# Patient Record
Sex: MALE
[De-identification: names, ages, dates, MRNs, and addresses within clinical notes are randomized per-mention and may not be internally consistent; named-entity substitution may affect disease eponyms.]

---

## 2018-03-12 PROBLEM — Z00.00 ENCOUNTER FOR PREVENTIVE HEALTH EXAMINATION: Status: ACTIVE | Noted: 2018-03-12

## 2018-04-02 ENCOUNTER — RECORD ABSTRACTING (OUTPATIENT)
Age: 53
End: 2018-04-02

## 2018-04-02 DIAGNOSIS — Z78.9 OTHER SPECIFIED HEALTH STATUS: ICD-10-CM

## 2018-04-02 RX ORDER — PRAVASTATIN SODIUM 20 MG/1
20 TABLET ORAL
Refills: 0 | Status: ACTIVE | COMMUNITY

## 2018-04-02 RX ORDER — AMLODIPINE BESYLATE AND BENAZEPRIL HYDROCHLORIDE 10; 20 MG/1; MG/1
10-20 CAPSULE ORAL
Refills: 0 | Status: ACTIVE | COMMUNITY

## 2018-04-02 RX ORDER — ASPIRIN 81 MG
81 TABLET, DELAYED RELEASE (ENTERIC COATED) ORAL DAILY
Refills: 0 | Status: ACTIVE | COMMUNITY

## 2018-04-02 RX ORDER — CALCIUM CARBONATE/VITAMIN D3 600 MG-10
TABLET ORAL
Refills: 0 | Status: ACTIVE | COMMUNITY

## 2018-04-09 ENCOUNTER — APPOINTMENT (OUTPATIENT)
Dept: CARDIOLOGY | Facility: CLINIC | Age: 53
End: 2018-04-09

## 2020-09-24 ENCOUNTER — NON-APPOINTMENT (OUTPATIENT)
Age: 55
End: 2020-09-24

## 2020-09-24 ENCOUNTER — APPOINTMENT (OUTPATIENT)
Dept: CARDIOLOGY | Facility: CLINIC | Age: 55
End: 2020-09-24
Payer: COMMERCIAL

## 2020-09-24 VITALS
DIASTOLIC BLOOD PRESSURE: 85 MMHG | WEIGHT: 224 LBS | OXYGEN SATURATION: 98 % | HEART RATE: 69 BPM | HEIGHT: 72 IN | TEMPERATURE: 97.8 F | RESPIRATION RATE: 16 BRPM | BODY MASS INDEX: 30.34 KG/M2 | SYSTOLIC BLOOD PRESSURE: 140 MMHG

## 2020-09-24 DIAGNOSIS — Z82.0 FAMILY HISTORY OF EPILEPSY AND OTHER DISEASES OF THE NERVOUS SYSTEM: ICD-10-CM

## 2020-09-24 DIAGNOSIS — Z78.9 OTHER SPECIFIED HEALTH STATUS: ICD-10-CM

## 2020-09-24 DIAGNOSIS — Z82.3 FAMILY HISTORY OF STROKE: ICD-10-CM

## 2020-09-24 DIAGNOSIS — D36.9 BENIGN NEOPLASM, UNSPECIFIED SITE: ICD-10-CM

## 2020-09-24 DIAGNOSIS — R51 HEADACHE: ICD-10-CM

## 2020-09-24 DIAGNOSIS — Z86.79 PERSONAL HISTORY OF OTHER DISEASES OF THE CIRCULATORY SYSTEM: ICD-10-CM

## 2020-09-24 DIAGNOSIS — Z82.49 FAMILY HISTORY OF ISCHEMIC HEART DISEASE AND OTHER DISEASES OF THE CIRCULATORY SYSTEM: ICD-10-CM

## 2020-09-24 PROCEDURE — 99204 OFFICE O/P NEW MOD 45 MIN: CPT

## 2020-09-24 PROCEDURE — 93000 ELECTROCARDIOGRAM COMPLETE: CPT

## 2020-09-30 NOTE — REASON FOR VISIT
[FreeTextEntry1] : 55-year-old patient presents here for cardiac evaluation, last having been seen more than three years ago.  He is concerned about a throbbing sensation in the left temple area. This began when he had a hangover about a week ago.  It dissipated after about a day.  He denies any shortness of breath, chest pain, or palpitations.  He does not check his blood pressure but knows that it is elevated. \par \par His other history is that of:\par 1.  Hyperlipidemia. \par 2.  Obesity.\par 3.  Dilated aortic root. 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 \par \par He admits that he is somewhat indiscriminate with diet and does not exercise regularly.  \par

## 2020-09-30 NOTE — ASSESSMENT
[FreeTextEntry1] :  The EKG shows normal rhythm at 69 with some non-specific T-wave changes. \par \par   Laboratory 6/8/20: \par Cholesterol 156\par  HDL 51\par  LDL 79 \par A1C of 5.5 \par Electrolytes and LFTs were within normal limits.     \par \par   Impression:  \par Patient presents with a transient pounding or throbbing sensation of the left temple more than a week ago that occurred in the face of a hangover and has not recurred.   He has hypertension which may or may not be adequately controlled, does not really know what he typically runs.   \par \par  He has obesity with a BMI of over 30.    There is a history of a dilated aortic root.   He is somewhat inattentive to nutrition and exercise.  \par \par   I discussed all of the above with the patient at some length and made recommendations including: \par 1.  Monitor his blood pressure over the next couple of weeks. \par 2.   Much stricter attention to nutrition in the form of carbohydrate, fat, and sodium restriction.  \par 3.  A regular exercise program to consist of 20-30 minutes of walking five days a week.   \par \par The patient will undergo an echocardiogram and an exercise stress test in the near future.  Because of his history of a dilated thoracic aorta, the abdominal aorta should also be checked in the near future.  \par \par Discussed all of this with the patient who understands and agrees and will follow up with regard to the above.   \par \par I appreciate the opportunity to contribute to his care.

## 2020-11-13 ENCOUNTER — APPOINTMENT (OUTPATIENT)
Dept: CARDIOLOGY | Facility: CLINIC | Age: 55
End: 2020-11-13
Payer: COMMERCIAL

## 2020-11-13 PROCEDURE — 93978 VASCULAR STUDY: CPT

## 2020-12-23 ENCOUNTER — APPOINTMENT (OUTPATIENT)
Dept: CARDIOLOGY | Facility: CLINIC | Age: 55
End: 2020-12-23
Payer: COMMERCIAL

## 2020-12-23 PROCEDURE — 99072 ADDL SUPL MATRL&STAF TM PHE: CPT

## 2020-12-23 PROCEDURE — 93015 CV STRESS TEST SUPVJ I&R: CPT

## 2020-12-23 PROCEDURE — 93306 TTE W/DOPPLER COMPLETE: CPT

## 2021-01-14 ENCOUNTER — APPOINTMENT (OUTPATIENT)
Dept: CARDIOLOGY | Facility: CLINIC | Age: 56
End: 2021-01-14
Payer: COMMERCIAL

## 2021-01-14 ENCOUNTER — NON-APPOINTMENT (OUTPATIENT)
Age: 56
End: 2021-01-14

## 2021-01-14 VITALS
SYSTOLIC BLOOD PRESSURE: 130 MMHG | OXYGEN SATURATION: 98 % | WEIGHT: 227 LBS | TEMPERATURE: 97.3 F | BODY MASS INDEX: 30.75 KG/M2 | HEART RATE: 70 BPM | HEIGHT: 72 IN | DIASTOLIC BLOOD PRESSURE: 98 MMHG | RESPIRATION RATE: 16 BRPM

## 2021-01-14 DIAGNOSIS — I77.810 THORACIC AORTIC ECTASIA: ICD-10-CM

## 2021-01-14 DIAGNOSIS — I10 ESSENTIAL (PRIMARY) HYPERTENSION: ICD-10-CM

## 2021-01-14 DIAGNOSIS — E78.5 HYPERLIPIDEMIA, UNSPECIFIED: ICD-10-CM

## 2021-01-14 PROCEDURE — 93000 ELECTROCARDIOGRAM COMPLETE: CPT

## 2021-01-14 PROCEDURE — 99072 ADDL SUPL MATRL&STAF TM PHE: CPT

## 2021-01-14 PROCEDURE — 99214 OFFICE O/P EST MOD 30 MIN: CPT

## 2021-01-14 NOTE — ASSESSMENT
[FreeTextEntry1] :  The EKG shows normal rhythm at  70  with some non-specific T-wave changes. \par \par   Laboratory 6/8/20: \par Cholesterol 156\par  HDL 51\par  LDL 79 \par A1C of 5.5 \par Electrolytes and LFTs were within normal limits.     \par \par  11/13/2020: Proximal abdominal aorta 3 cm fusiform dilatation of the proximal abdominal aorta noted.\par \par Echocardiogram 12/23/2020\par normal LV size and function.  Mild dilatation of the ascending aorta 3.6 cm.\par \par Exercise stress test 12/23/2020:\par 7 minutes 6 seconds\par Heart rate 130 submaximal 80%\par Blood pressure 180\par No ischemic ECG changes or arrhythmias.\par \par  Impression:  \par Patient presents with a transient pounding or throbbing sensation of the left temple, which has resolved.\par   He has hypertension with home measurements that indicate adequate control though today's reading seems to be elevated.  \par He has obesity with a BMI of over 30. \par \par    There is a history of a dilated aortic root.  Which is just borderline at 3.6 cm.\par \par Notably the abdominal aorta shows some mild proximal abdominal aortic dilatation at 3 cm.\par \par   He is somewhat inattentive to nutrition and exercise.  \par \par  Exercise stress test shows reduction in overall functional capacity likely reflecting lack of any regular exercise.  There is, over, no evidence of ischemia\par \par  I discussed all of the above with the patient at some length and made recommendations including: \par 1.  He should continue to monitor his blood pressure over the next couple of weeks. \par 2.   Much stricter attention to nutrition in the form of carbohydrate, fat, and sodium restriction.  \par 3.  A regular exercise program to consist of 20-30 minutes of walking five days a week.   \par 4.  Repeat abdominal aortic sonogram in 1 year\par 5, clinical follow-up in 6 months\par \par Discussed all of this with the patient who understands and agrees and will follow up with regard to the above.   \par \par I appreciate the opportunity to contribute to his care.

## 2021-01-14 NOTE — REASON FOR VISIT
[FreeTextEntry1] : 55-year-old patient  here for cardiac re-evaluation.\par  He was  concerned about a throbbing sensation in the left temple area that dissipated after about a day.  He denies any shortness of breath, chest pain, or palpitations. \par \par He has been checking his blood pressure at home running about 120/70 fairly consistently.\par \par His other history is that of:\par 1.  Hyperlipidemia. \par 2.  Obesity.\par 3.  Dilated aortic root. 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 \par \par He admits that he is somewhat indiscriminate with diet and does not exercise regularly.  \par \par Patient here to review the results of testing including:\par  an abdominal aortic sonogram\par An echocardiogram\par An exercise stress test

## 2023-10-03 ENCOUNTER — OFFICE (OUTPATIENT)
Dept: URBAN - METROPOLITAN AREA CLINIC 6 | Facility: CLINIC | Age: 58
Setting detail: OPHTHALMOLOGY
End: 2023-10-03
Payer: COMMERCIAL

## 2023-10-03 DIAGNOSIS — H25.13: ICD-10-CM

## 2023-10-03 DIAGNOSIS — D31.31: ICD-10-CM

## 2023-10-03 DIAGNOSIS — H43.393: ICD-10-CM

## 2023-10-03 PROCEDURE — 92250 FUNDUS PHOTOGRAPHY W/I&R: CPT | Performed by: OPHTHALMOLOGY

## 2023-10-03 PROCEDURE — 92014 COMPRE OPH EXAM EST PT 1/>: CPT | Performed by: OPHTHALMOLOGY

## 2023-10-03 ASSESSMENT — REFRACTION_AUTOREFRACTION
OD_SPHERE: 0.00
OS_AXIS: 079
OD_AXIS: 130
OS_CYLINDER: -1.25
OS_SPHERE: +0.75
OD_CYLINDER: -0.25

## 2023-10-03 ASSESSMENT — SPHEQUIV_DERIVED
OD_SPHEQUIV: -0.125
OD_SPHEQUIV: -0.125
OS_SPHEQUIV: 0.125
OS_SPHEQUIV: 0.125

## 2023-10-03 ASSESSMENT — REFRACTION_MANIFEST
OD_CYLINDER: -0.25
OS_SPHERE: +0.75
OU_VA: 20/20-1
OS_AXIS: 080
OD_ADD: +2.25
OS_VA1: 20/20-1
OD_AXIS: 130
OS_CYLINDER: -1.25
OD_VA1: 20/20-1
OS_ADD: +2.25
OD_SPHERE: 0.00

## 2023-10-03 ASSESSMENT — AXIALLENGTH_DERIVED
OD_AL: 24.0154
OD_AL: 24.0154
OS_AL: 23.9149
OS_AL: 23.9149

## 2023-10-03 ASSESSMENT — KERATOMETRY
OS_AXISANGLE_DEGREES: 168
OD_K2POWER_DIOPTERS: 42.75
OD_K1POWER_DIOPTERS: 42.25
OD_AXISANGLE_DEGREES: 090
OS_K1POWER_DIOPTERS: 42.00
OS_K2POWER_DIOPTERS: 43.00
METHOD_AUTO_MANUAL: AUTO

## 2023-10-03 ASSESSMENT — VISUAL ACUITY
OD_BCVA: 20/40-1
OS_BCVA: 20/25-1

## 2023-10-03 ASSESSMENT — TONOMETRY
OS_IOP_MMHG: 15
OD_IOP_MMHG: 17

## 2023-10-03 ASSESSMENT — CONFRONTATIONAL VISUAL FIELD TEST (CVF)
OD_FINDINGS: FULL
OS_FINDINGS: FULL

## 2024-10-29 ENCOUNTER — OFFICE (OUTPATIENT)
Dept: URBAN - METROPOLITAN AREA CLINIC 6 | Facility: CLINIC | Age: 59
Setting detail: OPHTHALMOLOGY
End: 2024-10-29
Payer: COMMERCIAL

## 2024-10-29 ENCOUNTER — APPOINTMENT (OUTPATIENT)
Dept: CARDIOLOGY | Facility: CLINIC | Age: 59
End: 2024-10-29
Payer: COMMERCIAL

## 2024-10-29 VITALS
RESPIRATION RATE: 16 BRPM | BODY MASS INDEX: 30.2 KG/M2 | DIASTOLIC BLOOD PRESSURE: 70 MMHG | HEIGHT: 72 IN | HEART RATE: 77 BPM | WEIGHT: 223 LBS | SYSTOLIC BLOOD PRESSURE: 132 MMHG | OXYGEN SATURATION: 96 %

## 2024-10-29 DIAGNOSIS — I77.810 THORACIC AORTIC ECTASIA: ICD-10-CM

## 2024-10-29 DIAGNOSIS — I10 ESSENTIAL (PRIMARY) HYPERTENSION: ICD-10-CM

## 2024-10-29 DIAGNOSIS — D31.31: ICD-10-CM

## 2024-10-29 DIAGNOSIS — H25.13: ICD-10-CM

## 2024-10-29 DIAGNOSIS — E78.5 HYPERLIPIDEMIA, UNSPECIFIED: ICD-10-CM

## 2024-10-29 DIAGNOSIS — H43.393: ICD-10-CM

## 2024-10-29 PROCEDURE — 99204 OFFICE O/P NEW MOD 45 MIN: CPT

## 2024-10-29 PROCEDURE — 93000 ELECTROCARDIOGRAM COMPLETE: CPT

## 2024-10-29 PROCEDURE — G2211 COMPLEX E/M VISIT ADD ON: CPT | Mod: NC

## 2024-10-29 PROCEDURE — 92014 COMPRE OPH EXAM EST PT 1/>: CPT | Performed by: OPHTHALMOLOGY

## 2024-10-29 PROCEDURE — 92250 FUNDUS PHOTOGRAPHY W/I&R: CPT | Performed by: OPHTHALMOLOGY

## 2024-10-29 ASSESSMENT — REFRACTION_AUTOREFRACTION
OD_AXIS: 127
OD_SPHERE: +0.25
OD_CYLINDER: -0.25
OS_AXIS: 074
OS_CYLINDER: -1.25
OS_SPHERE: +0.75

## 2024-10-29 ASSESSMENT — REFRACTION_MANIFEST
OD_AXIS: 130
OS_VA1: 20/30+2
OD_CYLINDER: -0.25
OS_SPHERE: +0.75
OS_AXIS: 074
OS_ADD: +2.25
OD_ADD: +2.25
OD_SPHERE: +0.25
OS_CYLINDER: -1.25
OD_VA1: 20/20-2
OU_VA: 20/20-2

## 2024-10-29 ASSESSMENT — KERATOMETRY
OD_AXISANGLE_DEGREES: 085
OD_K2POWER_DIOPTERS: 42.75
OS_K1POWER_DIOPTERS: 42.00
OS_AXISANGLE_DEGREES: 164
METHOD_AUTO_MANUAL: AUTO
OS_K2POWER_DIOPTERS: 42.75
OD_K1POWER_DIOPTERS: 42.25

## 2024-10-29 ASSESSMENT — TONOMETRY
OD_IOP_MMHG: 14
OS_IOP_MMHG: 13

## 2024-10-29 ASSESSMENT — CONFRONTATIONAL VISUAL FIELD TEST (CVF)
OD_FINDINGS: FULL
OS_FINDINGS: FULL

## 2024-10-29 ASSESSMENT — VISUAL ACUITY
OS_BCVA: 20/20
OD_BCVA: 20/60-1

## 2024-11-20 ENCOUNTER — APPOINTMENT (OUTPATIENT)
Dept: CT IMAGING | Facility: CLINIC | Age: 59
End: 2024-11-20
Payer: COMMERCIAL

## 2024-11-20 ENCOUNTER — OUTPATIENT (OUTPATIENT)
Dept: OUTPATIENT SERVICES | Facility: HOSPITAL | Age: 59
LOS: 1 days | End: 2024-11-20
Payer: COMMERCIAL

## 2024-11-20 DIAGNOSIS — I77.810 THORACIC AORTIC ECTASIA: ICD-10-CM

## 2024-11-20 DIAGNOSIS — I10 ESSENTIAL (PRIMARY) HYPERTENSION: ICD-10-CM

## 2024-11-20 DIAGNOSIS — E78.5 HYPERLIPIDEMIA, UNSPECIFIED: ICD-10-CM

## 2024-11-20 PROCEDURE — 75571 CT HRT W/O DYE W/CA TEST: CPT

## 2024-11-20 PROCEDURE — 75571 CT HRT W/O DYE W/CA TEST: CPT | Mod: 26

## 2024-11-25 ENCOUNTER — NON-APPOINTMENT (OUTPATIENT)
Age: 59
End: 2024-11-25

## 2024-11-25 DIAGNOSIS — R93.1 ABNORMAL FINDINGS ON DIAGNOSTIC IMAGING OF HEART AND CORONARY CIRCULATION: ICD-10-CM

## 2024-11-25 DIAGNOSIS — I10 ESSENTIAL (PRIMARY) HYPERTENSION: ICD-10-CM

## 2024-11-25 DIAGNOSIS — E78.5 HYPERLIPIDEMIA, UNSPECIFIED: ICD-10-CM

## 2025-01-08 ENCOUNTER — APPOINTMENT (OUTPATIENT)
Dept: CARDIOLOGY | Facility: CLINIC | Age: 60
End: 2025-01-08
Payer: COMMERCIAL

## 2025-01-08 PROCEDURE — 93015 CV STRESS TEST SUPVJ I&R: CPT

## 2025-01-15 ENCOUNTER — NON-APPOINTMENT (OUTPATIENT)
Age: 60
End: 2025-01-15

## 2025-01-16 ENCOUNTER — NON-APPOINTMENT (OUTPATIENT)
Age: 60
End: 2025-01-16

## 2025-04-01 ENCOUNTER — APPOINTMENT (OUTPATIENT)
Dept: CARDIOLOGY | Facility: CLINIC | Age: 60
End: 2025-04-01
Payer: COMMERCIAL

## 2025-04-01 VITALS
DIASTOLIC BLOOD PRESSURE: 70 MMHG | WEIGHT: 233 LBS | RESPIRATION RATE: 16 BRPM | BODY MASS INDEX: 31.56 KG/M2 | HEART RATE: 81 BPM | OXYGEN SATURATION: 95 % | HEIGHT: 72 IN | SYSTOLIC BLOOD PRESSURE: 120 MMHG

## 2025-04-01 DIAGNOSIS — I10 ESSENTIAL (PRIMARY) HYPERTENSION: ICD-10-CM

## 2025-04-01 DIAGNOSIS — R93.1 ABNORMAL FINDINGS ON DIAGNOSTIC IMAGING OF HEART AND CORONARY CIRCULATION: ICD-10-CM

## 2025-04-01 DIAGNOSIS — E78.5 HYPERLIPIDEMIA, UNSPECIFIED: ICD-10-CM

## 2025-04-01 DIAGNOSIS — I77.810 THORACIC AORTIC ECTASIA: ICD-10-CM

## 2025-04-01 PROCEDURE — G2211 COMPLEX E/M VISIT ADD ON: CPT | Mod: NC

## 2025-04-01 PROCEDURE — 99214 OFFICE O/P EST MOD 30 MIN: CPT

## 2025-04-01 PROCEDURE — 93000 ELECTROCARDIOGRAM COMPLETE: CPT

## 2025-04-01 RX ORDER — ROSUVASTATIN CALCIUM 10 MG/1
10 TABLET, FILM COATED ORAL
Qty: 90 | Refills: 3 | Status: ACTIVE | COMMUNITY
Start: 2025-04-01 | End: 1900-01-01

## 2025-09-08 ENCOUNTER — APPOINTMENT (OUTPATIENT)
Dept: CARDIOLOGY | Facility: CLINIC | Age: 60
End: 2025-09-08